# Patient Record
Sex: FEMALE | HISPANIC OR LATINO | Employment: UNEMPLOYED | URBAN - NONMETROPOLITAN AREA
[De-identification: names, ages, dates, MRNs, and addresses within clinical notes are randomized per-mention and may not be internally consistent; named-entity substitution may affect disease eponyms.]

---

## 2017-03-29 ENCOUNTER — TRANSCRIBE ORDERS (OUTPATIENT)
Dept: PHYSICAL THERAPY | Facility: HOSPITAL | Age: 62
End: 2017-03-29

## 2017-03-29 DIAGNOSIS — N94.2 VAGINISMUS: Primary | ICD-10-CM

## 2017-03-30 ENCOUNTER — HOSPITAL ENCOUNTER (OUTPATIENT)
Dept: PHYSICAL THERAPY | Facility: HOSPITAL | Age: 62
Discharge: HOME | End: 2017-03-30
Admitting: PHYSICAL THERAPIST

## 2017-03-30 DIAGNOSIS — N94.2 VAGINISMUS: Primary | ICD-10-CM

## 2017-03-30 PROCEDURE — 97162 PT EVAL MOD COMPLEX 30 MIN: CPT | Performed by: PHYSICAL THERAPIST

## 2017-03-31 NOTE — PROGRESS NOTES
Outpatient Physical Therapy Women's Health Initial Evaluation  [facility]     Patient Name: Triston Fox  : 1955  MRN: [id number]  Today's Date: 3/30/2017        Visit Date: 2017   Visit number:    Insurance auth: needs auth after eval   Request auth to start after  as patient leaving town until then  Recheck: 17  RTD: about 3 months    Subjective Improvement: 0%, first visit  There is no problem list on file for this patient.       Past Medical History:   Diagnosis Date   • Hypertension    • Stroke         Past Surgical History:   Procedure Laterality Date   • CARDIAC DEFIBRILLATOR PLACEMENT Bilateral 10/03/2016   • HYSTERECTOMY      oopherectomy         Visit Dx:    ICD-10-CM ICD-9-CM   1. Vaginismus N94.2 625.1                 Women's Health       17 1400          Subjective Comments    Subjective Comments One month ago s/s started.  Went to MD, test really hurt.  Could be due to bladder dysfunction.  Nocturia reported 3x/night.  Frequency of bladder every 30 minutes or less.  No pain or pressure associated to urination.  No strain to bladder.  Bladder leaks regularly.  Also pain with intercourse or manual exams.  Not sure of reason why she is seeing me but thought it was to help with her pain.  Bowel activity normal with [place] stool scale as 4 per patient review.    -SW      Pregnancy Questions    Number of Pregnancies 5  -SW      Number of Children 5  -SW      Type of Previous Deliveries Vaginal  -SW      Subjective Pain    Able to rate subjective pain? yes  -SW      Pre-Treatment Pain Level 8  -SW      Post-Treatment Pain Level 8  -SW      Pain Assessment    Pain Assessment 0-10  -SW      Pain Score 8  -SW      Pain Type Chronic pain  -SW      Pain Location Abdomen  -SW      Pain Descriptors Cramping  -SW      Pain Frequency Several days a week  -SW      Pain Onset Gradual  -SW      Date Pain First Started 17  -SW      Clinical Progression Not changed  -SW       Pain Intervention(s) Shower;Rest;Medication (See MAR)  -SW      Pelvic Floor Muscle    Strength (Right) 1: Trace   concerned language barrier may exist  -SW      Strength (Left) 1: Trace  -SW      Symmetry of Sustained Maximal Contraction Symmetrical  -SW      Endurance (Ability to Hold Maximal Contraction) --   difficult to assess due to low contractility  -SW      # of Reps of Maximal Contractions while Maintaining Endurance and Strength --  -SW      Fast Contraction (# of 1 sec contractions performed) --  -SW      Interior Muscle Comments intravaginal cuff ttp R>L along superficial transverse perineal muscle and deep levator group; multiple triggers noted within pelvis that referred pain to lower abdominal region.   -SW      Perineal Observation    Perineal Observation Performed? Yes  -SW      Observation of Contraction in Perineum    Anal [place] Present  -SW      Perineal Body Lift --   min lift noted  -SW      Pelvic Floor    Ability to Isolate Contraction of Pelvic Floor No  -SW      Prolapse (Pop-Q)    Cystocele Stage 0  -SW      Rectocele Stage 0  -      Education Provided On:    Education Points Other (comment)   bladder diary completion  -      Method of Delivery Verbal;Demonstration;Written  -      Education Provided To Patient  -      Level of Understanding Teach back education performed;Verbalized  -      HEP Comments finding the  muscle...weekend homework assignment; bladder diary completion  -        User Key  (r) = Recorded By, (t) = Taken By, (c) = Cosigned By    Initials Name Provider Type    ANASTASIA Porter, PT Physical Therapist        SEMG assessment was deferred due to decreased contractility of muscles via verbal and tactile cues.  Concerned there may have been lack of understanding of request with contraction though patient spoke fluent English. Will attempt next visit after return.                     PT Assessment/Plan       03/30/17 1900       PT Assessment    Functional  Limitations Performance in leisure activities;Performance in self-care ADL  -SW     Impairments Pain;Muscle strength   UI, myofascial pain lower abdomen and pelvis  -SW     Assessment Comments patient presents with myofascial pain syndrom of lower pelvis.  She has poor awareness of pelvic floor with inability to isolate pf with contraction. she has multiple triggers within pelvis and lower abdomen primarily to L side. patient would benefit from skilled intervention to address issues impeding QOL.  -     Rehab Potential Good  -SW     Patient/caregiver participated in establishment of treatment plan and goals Yes  -SW     Patient would benefit from skilled therapy intervention Yes  -SW     PT Plan    PT Frequency 1x/week  -SW     Predicted Duration of Therapy Intervention (days/wks) up to 15 visits  -     PT Plan Comments patient will be out of town until 4/25.  Request approval to begin after her return. Pelvic floor uptraining, behavioral management and training techniques; neuro zara; manual tissue release external abdomen and vaginally.  -       User Key  (r) = Recorded By, (t) = Taken By, (c) = Cosigned By    Initials Name Provider Type    ANASTASIA Porter, PT Physical Therapist                  Exercises       03/30/17 1400          Subjective Comments    Subjective Comments One month ago s/s started.  Went to MD, test really hurt.  Could be due to bladder dysfunction.  Nocturia reported 3x/night.  Frequency of bladder every 30 minutes or less.  No pain or pressure associated to urination.  No strain to bladder.  Bladder leaks regularly.  Also pain with intercourse or manual exams.  Not sure of reason why she is seeing me but thought it was to help with her pain.  Bowel activity normal with [place] stool scale as 4 per patient review.    -SW      Subjective Pain    Able to rate subjective pain? yes  -SW      Pre-Treatment Pain Level 8  -SW      Post-Treatment Pain Level 8  -SW        User Key  (r) =  Recorded By, (t) = Taken By, (c) = Cosigned By    Initials Name Provider Type    SW Gregoria Porter, PT Physical Therapist                            PT OP Goals       03/30/17 1905 03/30/17 1800    PT Short Term Goals    STG Date to Achieve  04/30/17  -SW    STG 1  independent completion of bladder dairy  -SW    STG 1 Progress  Not Met  -SW    STG 2  isolate pelvic floor contraction via mirror biofeedback  -SW    STG 2 Progress  Not Met  -SW    STG 3  pain reduction to min/mod levels versus severe pain to lower abdomen and pelvis  -SW    STG 3 Progress  Not Met  -SW    STG 4  demonstrate exercises for pelvic pain and verbalize compliance of 2/day  -SW    STG 4 Progress  Not Met  -SW    STG 5  bladder void regimen as prescribed based on diary review  -SW    STG 5 Progress  Not Met  -SW    STG 6  decrease dietary irritants as to lessen irritability to bladder and reduce urgency associated with leakage  -SW    STG 6 Progress  Not Met  -SW    Long Term Goals    LTG Date to Achieve  06/30/17  -SW    LTG 1  subjectively improve 70%  -SW    LTG 1 Progress  Not Met  -SW    LTG 2  VQ assessment of 8 or less  -SW    LTG 2 Progress  Not Met  -SW    LTG 3  pain min and intermittent vs constant  -SW    LTG 3 Progress  Not Met  -SW    Time Calculation    PT Goal Re-Cert Due Date 04/30/17  -SW 04/30/17  -SW      User Key  (r) = Recorded By, (t) = Taken By, (c) = Cosigned By    Initials Name Provider Type    ANASTASIA Porter, PT Physical Therapist                Therapy Education       03/30/17 1901          Therapy Education    Given Other (comment)   bladder diary completion  -SW      Program New  -SW      How Provided Verbal;Written  -SW      Provided to Patient  -      Level of Understanding Teach back education performed;Verbalized  -SW        User Key  (r) = Recorded By, (t) = Taken By, (c) = Cosigned By    Initials Name Provider Type    ANASTASIA Porter, PT Physical Therapist                 Outcome Measures        03/30/17 1800          Other Outcome Measure Tool Used    Other Outcome Measure Tool Comments VQ = 18  -SW      Functional Assessment    Outcome Measure Options Other Outcome Measure  -SW        User Key  (r) = Recorded By, (t) = Taken By, (c) = Cosigned By    Initials Name Provider Type    ANASTASIA Porter, PT Physical Therapist            Time Calculation:   Start Time: 1408  Stop Time: 1504  Time Calculation (min): 56 min    Therapy Charges for Today     Code Description Service Date Service Provider Modifiers Qty    09298660482 HC PT EVAL MOD COMPLEXITY 3 3/30/2017 Gregoria Porter, PT GP 1    [id number]  PT THER SUPP EA 15 MIN 3/30/2017 Gregoria Porter, PT GP 1          PT G-Codes  Outcome Measure Options: Other Outcome Measure       Gregoria Porter, PT  3/30/2017

## 2017-05-01 ENCOUNTER — APPOINTMENT (OUTPATIENT)
Dept: PHYSICAL THERAPY | Facility: HOSPITAL | Age: 62
End: 2017-05-01

## 2017-05-01 ENCOUNTER — DOCUMENTATION (OUTPATIENT)
Dept: PHYSICAL THERAPY | Facility: HOSPITAL | Age: 62
End: 2017-05-01

## 2017-05-01 DIAGNOSIS — N94.2 VAGINISMUS: Primary | ICD-10-CM

## 2017-05-24 ENCOUNTER — HOSPITAL ENCOUNTER (OUTPATIENT)
Dept: PHYSICAL THERAPY | Facility: HOSPITAL | Age: 62
Setting detail: THERAPIES SERIES
Discharge: HOME | End: 2017-05-24

## 2017-05-24 DIAGNOSIS — N94.2 VAGINISMUS: Primary | ICD-10-CM

## 2017-05-24 PROCEDURE — 97110 THERAPEUTIC EXERCISES: CPT | Performed by: PHYSICAL THERAPIST

## 2017-05-24 PROCEDURE — 97164 PT RE-EVAL EST PLAN CARE: CPT | Performed by: PHYSICAL THERAPIST

## 2017-05-24 PROCEDURE — 97140 MANUAL THERAPY 1/> REGIONS: CPT | Performed by: PHYSICAL THERAPIST

## 2017-05-30 ENCOUNTER — APPOINTMENT (OUTPATIENT)
Dept: PHYSICAL THERAPY | Facility: HOSPITAL | Age: 62
End: 2017-05-30

## 2017-06-08 ENCOUNTER — APPOINTMENT (OUTPATIENT)
Dept: PHYSICAL THERAPY | Facility: HOSPITAL | Age: 62
End: 2017-06-08

## 2019-06-13 ENCOUNTER — TRANSCRIBE ORDERS (OUTPATIENT)
Dept: PHYSICAL THERAPY | Facility: HOSPITAL | Age: 64
End: 2019-06-13

## 2019-06-13 DIAGNOSIS — N94.2 VAGINISMUS: Primary | ICD-10-CM

## 2019-07-16 ENCOUNTER — HOSPITAL ENCOUNTER (OUTPATIENT)
Dept: PHYSICAL THERAPY | Facility: HOSPITAL | Age: 64
Setting detail: THERAPIES SERIES
Discharge: HOME | End: 2019-07-16

## 2019-07-16 DIAGNOSIS — N94.2 VAGINISMUS: ICD-10-CM

## 2019-07-16 DIAGNOSIS — M62.838 MUSCLE SPASM: Primary | ICD-10-CM

## 2019-07-16 DIAGNOSIS — R10.2 PELVIC PAIN IN FEMALE: ICD-10-CM

## 2019-07-16 PROCEDURE — 97535 SELF CARE MNGMENT TRAINING: CPT | Performed by: PHYSICAL THERAPIST

## 2019-07-16 PROCEDURE — 97162 PT EVAL MOD COMPLEX 30 MIN: CPT | Performed by: PHYSICAL THERAPIST

## 2019-07-16 NOTE — THERAPY DISCHARGE NOTE
Outpatient Physical Therapy Pelvic Health Eval/Discharge  [facility]     Patient Name: Triston Fox  : 1955  MRN: [id number]  Today's Date: 2019        Visit Date: 2019  Visit Number:   Recheck: NA due to DC this date    There is no problem list on file for this patient.       Past Medical History:   Diagnosis Date   • Chicken pox    • GERD (gastroesophageal reflux disease)    • Hernia of abdominal cavity    • Hypertension    • Pelvic pain    • Stroke (CMS/HCC)         Past Surgical History:   Procedure Laterality Date   • CARDIAC DEFIBRILLATOR PLACEMENT Bilateral 10/03/2016   • HYSTERECTOMY      oopherectomy         Visit Dx:    ICD-10-CM ICD-9-CM   1. Muscle spasm M62.838 728.85   2. Vaginismus N94.2 625.1   3. Pelvic pain in female R10.2 625.9           Pelvic Health     Row Name 19 0900             Subjective Comments    Subjective Comments  Complaints of lower pelvic and abdomen pain.  Onset early .  Went to hospital for emergency due to pain in the lower abdomen and pelivs.  Xray found nothing. US was ok.  Pain noted all the time.  Pain comes and goes and unsure where.  Lower abdomen but no pain in vagina.  Sexual discomfort with pain. Hurts with vaginal exams but mostly felt in lower abodmen.  Denies any constipation.  Bowels daily without too much strain. No straining to get it out.  Bladder leaks noted due to urgency of bladder. Leaks daily at variable amounts.  Pads used when outside of house.  No pad at night. Nocturia 2x.  During day, voids avg about every hour. No relief in s/s with void of urine or bowel. Gastro referral with endoscopy scheduled on .  Found a hernia and reflux.  Most relief she gets is while she is sleeping. Referred to PT for pelvic pain and vaginal dryness.  Not taking any supplement due to expense.    -SW         Pregnancy Questions    Number of Pregnancies  5  -SW      Number of Children  5  -SW      Type of Previous Deliveries   Vaginal  -SW         Pain Assessment    Pain Assessment  0-10  -SW      Pain Score  5  -SW      Pain Location  Abdomen  -SW         Pelvic Floor Muscle    Strength (Right)  2: Squeeze no lift;3: Squeeze with/without lift  -SW      Strength (Left)  3: Squeeze with/without lift  -SW      Symmetry of Sustained Maximal Contraction  Symmetrical  -SW      Endurance (Ability to Hold Maximal Contraction)  3 sec  -SW      # of Reps of Maximal Contractions while Maintaining Endurance and Strength  2 sets  -SW      Fast Contraction (# of 1 sec contractions performed)  10  -SW      Internal Pelvic Floor Comments  Global tenderness noted.  Not muscle specific in nature. Loss of rugae noted vaginally. No reproduction of s/s associated with abdominal pain.   -SW      External Pelvic Floor Comments  Tenderness noted along the epigastric region. Also LUQ and RUQ tenderness as well. Adominal cavity soft and non-distended. Tenderness does not follow colon patterns.  Tenderness with bladder mobility in all directions. Pubic symphysis is also with tenderness but other bony anatomy in pelvic girdle is without discomfort. No vaginal discharge or lesions noted.  No regions of hypopigmentation noted either.   -SW         Observations    Perineal Observation Performed?  Yes  -SW      Posture Observations  Well nourished female without gait deviations.  No use of AD.  Patient is of [place] decent but English speaking and declines .  Alert and oriented to past medical history.   -SW         Observation of Contraction in Perineum    Anal [place]  Present  -SW      Perineal Body Lift  Present  -SW         Pelvic Floor    Ability to Isolate Contraction of Pelvic Floor  Yes  -SW         Cough    Bulge  Yes  -SW         Prolapse (Pop-Q)    Cystocele  Stage II  -SW         SEMG Evaluation    Pelvic Floor Electrode  Internal Vaginal  -SW      Baseline Resting  Yes  -SW      SEMG Evaluation Comments  Normal baselines noted to PF in supine  position.  Able to show isolated contractions of PF with full return to baselines without hesitancy. Good amplitudes also noted of 7-10 mv. Endurance noted at 3 sec for PF in isolation. Poor endurance to PF though recruitment is good with isolation of accessory.   -SW         Work/Rest (Quick Flicks)    Seconds (Working)  1  -SW      Seconds (Resting)  5  -SW      Repetitions  10  -SW      Pelvic Floor Work Average  7.0-10.2  -SW      Pelvic Floor Rest/Rise  6.8  -SW      Pelvic Floor Rest Average  1.2  -SW      Channel 2 Work Average  .71  -SW      Channel 2 Rest Average  0.15  -SW         Education Provided On:    Education Points  Behavioral modifications;Other (comment);Minimize Irritation to Vulvar Tissue with Handout;Information on dietary irritants to bladder/bowels log roll for protection; lubricant of coconut oil; void time  -      Method of Delivery  Verbal;Demonstration;Written  -SW      Education Provided To  Patient  -SW      Level of Understanding  Teach back education performed;Verbalized;Demonstrated  -SW      HEP Comments  log roll; lubricant of choice  -SW        User Key  (r) = Recorded By, (t) = Taken By, (c) = Cosigned By    Initials Name Provider Type    SW Gregoria Porter, PT Physical Therapist                       PT Assessment/Plan     Row Name 07/16/19 1000          PT Assessment    Functional Limitations  Limitation in home management;Limitations in community activities;Performance in leisure activities;Performance in self-care ADL  -SW     Impairments  Impaired muscle endurance;Pain;Muscle strength;Posture;Poor body mechanics;Other (comment) UI, urgency, pelvic pain, abdominal pain  -SW     Assessment Comments  Patient is a 65 yo female well known to PT clinic for having initiated therapy here previously.  She presents with primary complaint of pain in abdominal region along with urinary urgency/UI and some pelvic pain. Patient demonstrated normal PF recruitment and coordination  with SEMG this date. Slightly dec in endurance of PF was only remarkable finding. Pelvic exam shows atrophic changes associated with post-menopausal state but none of pelvic examination reproduced s/s associated with abdominal pain. Vaginal dryness recommendations were given of coconut oil post shower and during use with intimacy as patient could not afford Premarin cream originally issued by MD. Though she admits to having UI and urgency, she does not feel that it impairs her daily activity like that of abdominal pain.  Though PT is appropriate to address those impairments, she wishes to hold until determination could be made as to abdominal pain.  Per subjective reports, she has been told there is a hernia present which very well may contribute to complaints.  Patient states she may return for additional treatment post completion of consult for hernia, but at this time wishes to self manage with vaginal dryness and dyspareunia.    -     Rehab Potential  Good  -     Patient/caregiver participated in establishment of treatment plan and goals  Yes  -     Patient would benefit from skilled therapy intervention  Yes  -        PT Plan    PT Frequency  -- Patient declines FU at this time.  -     PT Plan Comments  No formal therapy scheduled from this date.  Patient will manage at home based on her decision until further analysis is completed to hernia.  She feels the pelvic conditions are separate from primary source of pain and wishes to hold until primary issue is completed.  HEP issued this date with recommendations to continue with self management and consult at later date should she decide to pursue treatment to PF.  -       User Key  (r) = Recorded By, (t) = Taken By, (c) = Cosigned By    Initials Name Provider Type    Gregoria Montalvo, PT Physical Therapist              Exercises     Row Name 07/16/19 0900             Subjective Comments    Subjective Comments  Complaints of lower pelvic and  abdomen pain.  Onset early 2017.  Went to hospital for emergency due to pain in the lower abdomen and pelivs.  Xray found nothing. US was ok.  Pain noted all the time.  Pain comes and goes and unsure where.  Lower abdomen but no pain in vagina.  Sexual discomfort with pain. Hurts with vaginal exams but mostly felt in lower abodmen.  Denies any constipation.  Bowels daily without too much strain. No straining to get it out.  Bladder leaks noted due to urgency of bladder. Leaks daily at variable amounts.  Pads used when outside of house.  No pad at night. Nocturia 2x.  During day, voids avg about every hour. No relief in s/s with void of urine or bowel. Gastro referral with endoscopy scheduled on 7/30.  Found a hernia and reflux.  Most relief she gets is while she is sleeping. Referred to PT for pelvic pain and vaginal dryness.  Not taking any supplement due to expense.    -SW         Exercise 1    Exercise Name 1  Lubricant choices  -SW      Additional Comments   Discussed lubricant choices with preferred method being that of water base solubility, paraben free and glycerin free. Recommended daily use of coconut oil to assist with hydration of tissues and could also use for lubricant as desired.  -SW         Exercise 2    Additional Comments  Demo with teach back and I for log roll to help protect abdomoninal hernia as patient describes.   -SW      Time (Seconds) 2  Log Roll technique for transfer  -SW         Exercise 3    Exercise Name 3  PF uptraining  -SW      Sets 3  2  -SW      Reps 3  5  -SW      Time 3  3 sec  -SW      Additional Comments  HEP for uptraining to PF to assist with poor endurance. 2 sets of 5 recommended 2x per day for strengthening.  -         Exercise 4    Exercise Name 4  bladder void training  -      Additional Comments  Encouraged void propting at 1 hour intervals then gradually increase void time by 30 minute intervals as tolerated every week to attempt bladder training.   -SW         User Key  (r) = Recorded By, (t) = Taken By, (c) = Cosigned By    Initials Name Provider Type    Gregoria Montalvo, PT Physical Therapist                      PT OP Goals     Row Name 07/16/19 1100          Time Calculation    PT Goal Re-Cert Due Date  -- NO recert or goals due to DC this date.  -       User Key  (r) = Recorded By, (t) = Taken By, (c) = Cosigned By    Initials Name Provider Type    Gregoria Montalvo PT Physical Therapist          Therapy Education  Given: HEP, Symptoms/condition management, Posture/body mechanics  Program: New  How Provided: Verbal, Demonstration  Provided to: Patient  Level of Understanding: Teach back education performed, Verbalized, Demonstrated                Time Calculation:   Start Time: 0930  Stop Time: 1025  Time Calculation (min): 55 min    Therapy Charges for Today     Code Description Service Date Service Provider Modifiers Qty    57567375616 HC PT EVAL MOD COMPLEXITY 3 7/16/2019 Gregoria Porter, PT GP 1    12034205442 HC PT SELF CARE/MGMT/TRAIN EA 15 MIN 7/16/2019 Gregoria Porter, PT GP 1                     Gregoria Porter PT  7/16/2019